# Patient Record
Sex: FEMALE | Race: WHITE | NOT HISPANIC OR LATINO
[De-identification: names, ages, dates, MRNs, and addresses within clinical notes are randomized per-mention and may not be internally consistent; named-entity substitution may affect disease eponyms.]

---

## 2017-10-08 ENCOUNTER — TRANSCRIPTION ENCOUNTER (OUTPATIENT)
Age: 62
End: 2017-10-08

## 2018-07-27 ENCOUNTER — TRANSCRIPTION ENCOUNTER (OUTPATIENT)
Age: 63
End: 2018-07-27

## 2019-02-06 ENCOUNTER — OUTPATIENT (OUTPATIENT)
Dept: OUTPATIENT SERVICES | Facility: HOSPITAL | Age: 64
LOS: 1 days | End: 2019-02-06
Payer: COMMERCIAL

## 2019-02-06 PROCEDURE — 71046 X-RAY EXAM CHEST 2 VIEWS: CPT | Mod: 26

## 2019-03-28 ENCOUNTER — TRANSCRIPTION ENCOUNTER (OUTPATIENT)
Age: 64
End: 2019-03-28

## 2020-01-06 ENCOUNTER — TRANSCRIPTION ENCOUNTER (OUTPATIENT)
Age: 65
End: 2020-01-06

## 2020-01-10 ENCOUNTER — TRANSCRIPTION ENCOUNTER (OUTPATIENT)
Age: 65
End: 2020-01-10

## 2020-05-06 ENCOUNTER — TRANSCRIPTION ENCOUNTER (OUTPATIENT)
Age: 65
End: 2020-05-06

## 2020-07-01 ENCOUNTER — TRANSCRIPTION ENCOUNTER (OUTPATIENT)
Age: 65
End: 2020-07-01

## 2020-11-10 ENCOUNTER — APPOINTMENT (OUTPATIENT)
Dept: RADIOLOGY | Facility: CLINIC | Age: 65
End: 2020-11-10
Payer: MEDICAID

## 2020-11-10 PROCEDURE — 71046 X-RAY EXAM CHEST 2 VIEWS: CPT

## 2021-03-31 PROBLEM — Z00.00 ENCOUNTER FOR PREVENTIVE HEALTH EXAMINATION: Status: ACTIVE | Noted: 2021-03-31

## 2021-04-10 ENCOUNTER — NON-APPOINTMENT (OUTPATIENT)
Age: 66
End: 2021-04-10

## 2021-04-10 ENCOUNTER — APPOINTMENT (OUTPATIENT)
Dept: CARDIOLOGY | Facility: CLINIC | Age: 66
End: 2021-04-10
Payer: MEDICAID

## 2021-04-10 VITALS
HEART RATE: 94 BPM | DIASTOLIC BLOOD PRESSURE: 70 MMHG | HEIGHT: 59 IN | TEMPERATURE: 97.8 F | SYSTOLIC BLOOD PRESSURE: 130 MMHG | RESPIRATION RATE: 12 BRPM | OXYGEN SATURATION: 100 % | WEIGHT: 115 LBS | BODY MASS INDEX: 23.18 KG/M2

## 2021-04-10 VITALS
OXYGEN SATURATION: 100 % | WEIGHT: 115 LBS | HEART RATE: 94 BPM | DIASTOLIC BLOOD PRESSURE: 70 MMHG | TEMPERATURE: 97.8 F | HEIGHT: 59 IN | SYSTOLIC BLOOD PRESSURE: 130 MMHG | BODY MASS INDEX: 23.18 KG/M2

## 2021-04-10 DIAGNOSIS — Z82.49 FAMILY HISTORY OF ISCHEMIC HEART DISEASE AND OTHER DISEASES OF THE CIRCULATORY SYSTEM: ICD-10-CM

## 2021-04-10 DIAGNOSIS — Z78.9 OTHER SPECIFIED HEALTH STATUS: ICD-10-CM

## 2021-04-10 DIAGNOSIS — Z87.09 PERSONAL HISTORY OF OTHER DISEASES OF THE RESPIRATORY SYSTEM: ICD-10-CM

## 2021-04-10 PROCEDURE — 93000 ELECTROCARDIOGRAM COMPLETE: CPT

## 2021-04-10 PROCEDURE — 99204 OFFICE O/P NEW MOD 45 MIN: CPT | Mod: 25

## 2021-04-10 PROCEDURE — 99072 ADDL SUPL MATRL&STAF TM PHE: CPT

## 2021-04-10 NOTE — DISCUSSION/SUMMARY
[FreeTextEntry1] : 1. Systolic Murmur: echo to screen of aortic valve stenosis. \par \par 2. Dyspnea: chronic and patient attributes it to her asthma. Echocardiogram as above. \par \par Office will call with results.\par \par Follow up in 6 months.

## 2021-04-10 NOTE — HISTORY OF PRESENT ILLNESS
[FreeTextEntry1] : 65 year old female with history of asthma since her teenage years presents for the evaluation of a heart murmur. She sees pulmonologist, Dr. Chaves, for her asthma and she states he auscultated that her murmur got louder. Patient has no exertional chest pain or pressure. No palpitations. No lightheadedness or syncope. Patient does states she has chronic dyspnea on exertion but attributes this to her asthma.

## 2021-04-10 NOTE — PHYSICAL EXAM
[General Appearance - Well Developed] : well developed [Normal Appearance] : normal appearance [Well Groomed] : well groomed [General Appearance - Well Nourished] : well nourished [No Deformities] : no deformities [General Appearance - In No Acute Distress] : no acute distress [Normal Conjunctiva] : the conjunctiva exhibited no abnormalities [Eyelids - No Xanthelasma] : the eyelids demonstrated no xanthelasmas [Heart Rate And Rhythm] : heart rate and rhythm were normal [Heart Sounds] : normal S1 and S2 [Edema] : no peripheral edema present [FreeTextEntry1] : 2/6 systolic ejection murmur RUSB [Respiration, Rhythm And Depth] : normal respiratory rhythm and effort [Auscultation Breath Sounds / Voice Sounds] : lungs were clear to auscultation bilaterally [Exaggerated Use Of Accessory Muscles For Inspiration] : no accessory muscle use [Abnormal Walk] : normal gait [Gait - Sufficient For Exercise Testing] : the gait was sufficient for exercise testing [Nail Clubbing] : no clubbing of the fingernails [Cyanosis, Localized] : no localized cyanosis [] : no ischemic changes

## 2021-04-10 NOTE — REASON FOR VISIT
[Initial Evaluation] : an initial evaluation of [Dyspnea] : dyspnea [FreeTextEntry1] : systolic murmur

## 2021-04-28 ENCOUNTER — NON-APPOINTMENT (OUTPATIENT)
Age: 66
End: 2021-04-28

## 2021-04-29 ENCOUNTER — APPOINTMENT (OUTPATIENT)
Dept: CARDIOLOGY | Facility: CLINIC | Age: 66
End: 2021-04-29
Payer: MEDICAID

## 2021-04-29 PROCEDURE — 93306 TTE W/DOPPLER COMPLETE: CPT

## 2021-04-29 PROCEDURE — 99072 ADDL SUPL MATRL&STAF TM PHE: CPT

## 2021-05-06 ENCOUNTER — APPOINTMENT (OUTPATIENT)
Dept: CARDIOLOGY | Facility: CLINIC | Age: 66
End: 2021-05-06
Payer: MEDICAID

## 2021-05-06 VITALS
TEMPERATURE: 98 F | HEART RATE: 89 BPM | OXYGEN SATURATION: 95 % | DIASTOLIC BLOOD PRESSURE: 60 MMHG | BODY MASS INDEX: 23.18 KG/M2 | HEIGHT: 59 IN | SYSTOLIC BLOOD PRESSURE: 110 MMHG | WEIGHT: 115 LBS

## 2021-05-06 PROCEDURE — 99215 OFFICE O/P EST HI 40 MIN: CPT

## 2021-05-06 PROCEDURE — 99072 ADDL SUPL MATRL&STAF TM PHE: CPT

## 2021-05-06 NOTE — DISCUSSION/SUMMARY
[FreeTextEntry1] : 1. Dyspnea/HOCM: echocardiogram demonstrates what is likely HOCM. Patient with no HTN history. Will obtain cardiac MRI to confirm diagnosis, accurately measure LV wall thickness, and detect for LGE.  LVOT peak gradient of 77mmHg, so will start Cardizem CD 180mg daily (no beta blocker therapy due to active wheezing). Patient with no family history of sudden explained death, no known family history of HCM. Patient has no personal history of lightheadedness or syncope. Once cardiac MRI performed and reviewed will bring back patient to discuss family follow up regarding screening for HCM, as well as refer to HF specialist Dr. Khan. At that time will also order event monitor to detect any episodes of NSVT or paroxysmal atrial fibrillation. \par \par 2. Pericardial Effusion: periodic echo surveillance.

## 2021-05-06 NOTE — CARDIOLOGY SUMMARY
[de-identified] :  4/10/2021, NSR with possible combined atrial enlargement [de-identified] : 4/29/2021, LV EF >65%, VALERYI with mild MR, moderate LVH, septum 1.4cm, PWT 1.2cm. LVOT obstruction with peak gradient of 77mmHg, pericardial effusion inferiorly, mild TR with estimated PASP of 44mHg.

## 2021-05-06 NOTE — PHYSICAL EXAM
[Normal] : moves all extremities, no focal deficits, normal speech [de-identified] : No JVD, no carotid artery bruits auscultated bilaterally [de-identified] : heart rate and rhythm were normal, normal S1 and S2 and no peripheral edema present. \par 2/6 systolic ejection murmur RUSB.  [de-identified] : expiratory wheezing bilateral lung fields.

## 2021-05-06 NOTE — REVIEW OF SYSTEMS
[Joint Stiffness] : joint stiffness [Negative] : Neurological [FreeTextEntry5] : see HPI [FreeTextEntry6] : see HPI

## 2021-06-29 ENCOUNTER — OUTPATIENT (OUTPATIENT)
Dept: OUTPATIENT SERVICES | Facility: HOSPITAL | Age: 66
LOS: 1 days | End: 2021-06-29
Payer: MEDICAID

## 2021-06-29 ENCOUNTER — RESULT REVIEW (OUTPATIENT)
Age: 66
End: 2021-06-29

## 2021-06-29 ENCOUNTER — APPOINTMENT (OUTPATIENT)
Dept: MRI IMAGING | Facility: CLINIC | Age: 66
End: 2021-06-29
Payer: MEDICAID

## 2021-06-29 DIAGNOSIS — I42.1 OBSTRUCTIVE HYPERTROPHIC CARDIOMYOPATHY: ICD-10-CM

## 2021-06-29 PROCEDURE — 75561 CARDIAC MRI FOR MORPH W/DYE: CPT

## 2021-06-29 PROCEDURE — 75565 CARD MRI VELOC FLOW MAPPING: CPT

## 2021-06-29 PROCEDURE — A9585: CPT

## 2021-06-29 PROCEDURE — 75561 CARDIAC MRI FOR MORPH W/DYE: CPT | Mod: 26

## 2021-06-29 PROCEDURE — 75565 CARD MRI VELOC FLOW MAPPING: CPT | Mod: 26

## 2021-07-13 ENCOUNTER — NON-APPOINTMENT (OUTPATIENT)
Age: 66
End: 2021-07-13

## 2021-07-20 ENCOUNTER — APPOINTMENT (OUTPATIENT)
Dept: CARDIOLOGY | Facility: CLINIC | Age: 66
End: 2021-07-20
Payer: MEDICAID

## 2021-07-20 VITALS
WEIGHT: 115 LBS | OXYGEN SATURATION: 96 % | TEMPERATURE: 97.8 F | BODY MASS INDEX: 23.18 KG/M2 | HEIGHT: 59 IN | DIASTOLIC BLOOD PRESSURE: 60 MMHG | SYSTOLIC BLOOD PRESSURE: 122 MMHG | HEART RATE: 90 BPM

## 2021-07-20 PROCEDURE — 99214 OFFICE O/P EST MOD 30 MIN: CPT

## 2021-07-20 PROCEDURE — 99072 ADDL SUPL MATRL&STAF TM PHE: CPT

## 2021-07-20 RX ORDER — BUDESONIDE AND FORMOTEROL FUMARATE DIHYDRATE 160; 4.5 UG/1; UG/1
160-4.5 AEROSOL RESPIRATORY (INHALATION)
Refills: 0 | Status: DISCONTINUED | COMMUNITY
End: 2021-07-20

## 2021-07-20 RX ORDER — ALBUTEROL SULFATE 90 UG/1
108 (90 BASE) AEROSOL, METERED RESPIRATORY (INHALATION)
Refills: 0 | Status: DISCONTINUED | COMMUNITY
End: 2021-07-20

## 2021-07-20 RX ORDER — ALBUTEROL 90 MCG
90 AEROSOL (GRAM) INHALATION
Refills: 0 | Status: DISCONTINUED | COMMUNITY
End: 2021-07-20

## 2021-07-20 RX ORDER — BUDESONIDE, GLYCOPYRROLATE, AND FORMOTEROL FUMARATE 160; 9; 4.8 UG/1; UG/1; UG/1
160-9-4.8 AEROSOL, METERED RESPIRATORY (INHALATION)
Refills: 0 | Status: ACTIVE | COMMUNITY

## 2021-07-20 RX ORDER — FLUTICASONE PROPIONATE AND SALMETEROL 500; 50 UG/1; UG/1
500-50 POWDER RESPIRATORY (INHALATION)
Refills: 0 | Status: DISCONTINUED | COMMUNITY
End: 2021-07-20

## 2021-07-20 NOTE — HISTORY OF PRESENT ILLNESS
[FreeTextEntry1] : Historical Perspective:\par 65 year old female with history of asthma since her teenage years presents for the evaluation of a heart murmur. She sees pulmonologist, Dr. Chaves, for her asthma and she states he auscultated that her murmur got louder. Patient has no exertional chest pain or pressure. No palpitations. No lightheadedness or syncope. Patient does states she has chronic dyspnea on exertion but attributes this to her asthma. \par \par Current Health Status:\par Since seeing me last patient saw pulmonologist, Dr. Chaves, and had her medications adjusted. She states her SOB has improved. There is no chest pain or pressure. No palpitations. No lightheadedness.

## 2021-07-20 NOTE — DISCUSSION/SUMMARY
[FreeTextEntry1] : 1. Dyspnea/HOCM: echocardiogram demonstrates what is likely HOCM.  LVOT peak gradient of 77mmHg on echocardiogram from April 202, so started on Cardizem CD 180mg daily (no beta blocker therapy due to active wheezing). Patient had cardiac MRI done 6/29/2021, which demonstrated hyperdynamic LV. Basal anteroseptal, anterior, and anterolateral wall measured 1.5mm. No late gadolinium enhancement. Increased velocities out LVOT but no obstruction. Patient with no family history of sudden explained death, no known family history of HCM. Patient has no personal history of lightheadedness or syncope. Recommend increasing Cardizem CD to 240mg daily today and titrate up to resting HRs in the 60s as BP tolerates. \par \par 2. Pericardial Effusion: periodic echo surveillance. \par \par Follow up in 3 months.

## 2021-07-20 NOTE — PHYSICAL EXAM
[Normal] : moves all extremities, no focal deficits, normal speech [de-identified] : No JVD, no carotid artery bruits auscultated bilaterally Allergy; [de-identified] : heart rate and rhythm were normal, normal S1 and S2 and no peripheral edema present. \par 2/6 systolic ejection murmur RUSB.  [de-identified] : expiratory wheezing bilateral lung fields.

## 2021-07-20 NOTE — CARDIOLOGY SUMMARY
[de-identified] :  4/10/2021, NSR with possible combined atrial enlargement [de-identified] : 4/29/2021, LV EF >65%, VALERIY with mild MR, moderate LVH, septum 1.4cm, PWT 1.2cm. LVOT obstruction with peak gradient of 77mmHg, pericardial effusion inferiorly, mild TR with estimated PASP of 44mHg. [de-identified] : 6/29/2021, Cardiac MRI, Hyperdynamic LV. Basal anteroseptal, anterior, and anterolateral wall measured 1.5mm. No late gadolinium enhancement. Increased velocities out LVOT but no obstruction.

## 2021-10-26 ENCOUNTER — APPOINTMENT (OUTPATIENT)
Dept: CARDIOLOGY | Facility: CLINIC | Age: 66
End: 2021-10-26
Payer: MEDICARE

## 2021-10-26 ENCOUNTER — NON-APPOINTMENT (OUTPATIENT)
Age: 66
End: 2021-10-26

## 2021-10-26 VITALS
DIASTOLIC BLOOD PRESSURE: 62 MMHG | OXYGEN SATURATION: 95 % | WEIGHT: 113 LBS | BODY MASS INDEX: 22.78 KG/M2 | HEART RATE: 83 BPM | HEIGHT: 59 IN | SYSTOLIC BLOOD PRESSURE: 110 MMHG

## 2021-10-26 PROCEDURE — 99214 OFFICE O/P EST MOD 30 MIN: CPT | Mod: 25

## 2021-10-26 PROCEDURE — 93000 ELECTROCARDIOGRAM COMPLETE: CPT

## 2021-10-26 NOTE — DISCUSSION/SUMMARY
[FreeTextEntry1] : 1. Dyspnea/HOCM: echocardiogram demonstrates what is likely HOCM.  LVOT peak gradient of 77mmHg on echocardiogram from April 202, so started on Cardizem CD (no beta blocker therapy due to active wheezing/asthma in the past). Patient had cardiac MRI done 6/29/2021, which demonstrated hyperdynamic LV. Basal anteroseptal, anterior, and anterolateral wall measured 1.5mm. No late gadolinium enhancement. Increased velocities out LVOT but no obstruction. Patient with no family history of sudden explained death, no known family history of HCM. Patient has no personal history of lightheadedness or syncope. Recommend continuing Cardizem CD to 240mg daily.\par \par 2. Pericardial Effusion: periodic echo surveillance. \par \par Follow up in 6 months.

## 2021-10-26 NOTE — CARDIOLOGY SUMMARY
[de-identified] : 10/26/2021, NSR [de-identified] : 4/29/2021, LV EF >65%, VALERIY with mild MR, moderate LVH, septum 1.4cm, PWT 1.2cm. LVOT obstruction with peak gradient of 77mmHg, pericardial effusion inferiorly, mild TR with estimated PASP of 44mHg. [de-identified] : 6/29/2021, Cardiac MRI, Hyperdynamic LV. Basal anteroseptal, anterior, and anterolateral wall measured 1.5mm. No late gadolinium enhancement. Increased velocities out LVOT but no obstruction.

## 2021-10-26 NOTE — PHYSICAL EXAM
[Normal S1, S2] : normal S1, S2 [Normal] : moves all extremities, no focal deficits, normal speech [de-identified] : No carotid artery bruits auscultated bilaterally [de-identified] : 3/6 systolic ejection murmur base of heart [de-identified] : \par 2/6 systolic ejection murmur RUSB.  30-May-2018

## 2022-04-22 ENCOUNTER — APPOINTMENT (OUTPATIENT)
Dept: CARDIOLOGY | Facility: CLINIC | Age: 67
End: 2022-04-22
Payer: MEDICARE

## 2022-04-22 PROCEDURE — 93306 TTE W/DOPPLER COMPLETE: CPT

## 2022-05-13 ENCOUNTER — NON-APPOINTMENT (OUTPATIENT)
Age: 67
End: 2022-05-13

## 2022-05-13 ENCOUNTER — APPOINTMENT (OUTPATIENT)
Dept: CARDIOLOGY | Facility: CLINIC | Age: 67
End: 2022-05-13
Payer: MEDICARE

## 2022-05-13 VITALS
SYSTOLIC BLOOD PRESSURE: 114 MMHG | HEART RATE: 80 BPM | OXYGEN SATURATION: 96 % | DIASTOLIC BLOOD PRESSURE: 62 MMHG | BODY MASS INDEX: 23.18 KG/M2 | WEIGHT: 115 LBS | HEIGHT: 59 IN

## 2022-05-13 PROCEDURE — 99214 OFFICE O/P EST MOD 30 MIN: CPT | Mod: 25

## 2022-05-13 PROCEDURE — 93000 ELECTROCARDIOGRAM COMPLETE: CPT

## 2022-05-13 RX ORDER — MONTELUKAST 10 MG/1
10 TABLET, FILM COATED ORAL DAILY
Refills: 0 | Status: DISCONTINUED | COMMUNITY
End: 2022-05-13

## 2022-05-13 RX ORDER — FLUTICASONE FUROATE, UMECLIDINIUM BROMIDE AND VILANTEROL TRIFENATATE 100; 62.5; 25 UG/1; UG/1; UG/1
100-62.5-25 POWDER RESPIRATORY (INHALATION)
Refills: 0 | Status: DISCONTINUED | COMMUNITY
End: 2022-05-13

## 2022-05-13 NOTE — DISCUSSION/SUMMARY
[FreeTextEntry1] : 1. Dyspnea/HOCM: echocardiogram demonstrates what is likely HOCM.  LVOT peak gradient of 77mmHg on echocardiogram from April 2021q, so started on Cardizem CD (no beta blocker therapy due to active wheezing/asthma in the past). Patient had cardiac MRI done 6/29/2021, which demonstrated hyperdynamic LV. Basal anteroseptal, anterior, and anterolateral wall measured 1.5mm. No late gadolinium enhancement. Increased velocities out LVOT but no obstruction. Patient with no family history of sudden explained death, no known family history of HCM. Patient has no personal history of lightheadedness or syncope. Recommend continuing Cardizem CD to 240mg daily. Repeat echocardiogram showed improvement.\par \par 2. Pericardial Effusion: Stable. Periodic echo surveillance. \par \par Follow up in 6 months. \par \par

## 2022-05-13 NOTE — CARDIOLOGY SUMMARY
[de-identified] : 10/26/2021, NSR [de-identified] : 4/22/2022, LV EF >65%, VALERIY with mild MR, mild LVH, LVOT obstruction with peak gradient 18mmHg, pericardial effusion inferiorly measuring 1.4cm, mild TR with estimated PASP of 32mmHg.\par 4/29/2021, LV EF >65%, VALERIY with mild MR, moderate LVH, septum 1.4cm, PWT 1.2cm. LVOT obstruction with peak gradient of 77mmHg, pericardial effusion inferiorly, mild TR with estimated PASP of 44mHg. [de-identified] : 6/29/2021, Cardiac MRI, Hyperdynamic LV. Basal anteroseptal, anterior, and anterolateral wall measured 1.5mm. No late gadolinium enhancement. Increased velocities out LVOT but no obstruction.

## 2022-05-13 NOTE — PHYSICAL EXAM
[Normal S1, S2] : normal S1, S2 [Normal] : moves all extremities, no focal deficits, normal speech [de-identified] : No carotid artery bruits auscultated bilaterally [de-identified] : 3/6 systolic ejection murmur base of heart [de-identified] : \par 2/6 systolic ejection murmur RUSB.

## 2022-05-13 NOTE — HISTORY OF PRESENT ILLNESS
[FreeTextEntry1] : Historical Perspective:\par 65 year old female with history of asthma since her teenage years presents for the evaluation of a heart murmur. She sees pulmonologist, Dr. Chaves, for her asthma and she states he auscultated that her murmur got louder. Patient has no exertional chest pain or pressure. No palpitations. No lightheadedness or syncope. Patient does states she has chronic dyspnea on exertion but attributes this to her asthma. \par \par Current Health Status:\par Patient with no chest pain or palpitations. Dyspnea is stable. No hospitalizations since seeing me last. Remains compliant with his medications and reports no adverse effects.\par

## 2022-10-26 ENCOUNTER — NON-APPOINTMENT (OUTPATIENT)
Age: 67
End: 2022-10-26

## 2022-11-17 ENCOUNTER — NON-APPOINTMENT (OUTPATIENT)
Age: 67
End: 2022-11-17

## 2022-11-17 ENCOUNTER — APPOINTMENT (OUTPATIENT)
Dept: CARDIOLOGY | Facility: CLINIC | Age: 67
End: 2022-11-17

## 2022-11-17 VITALS
OXYGEN SATURATION: 96 % | HEIGHT: 59 IN | BODY MASS INDEX: 23.18 KG/M2 | DIASTOLIC BLOOD PRESSURE: 60 MMHG | WEIGHT: 115 LBS | SYSTOLIC BLOOD PRESSURE: 112 MMHG | HEART RATE: 93 BPM

## 2022-11-17 PROCEDURE — 93000 ELECTROCARDIOGRAM COMPLETE: CPT

## 2022-11-17 PROCEDURE — 99214 OFFICE O/P EST MOD 30 MIN: CPT | Mod: 25

## 2022-11-17 NOTE — PHYSICAL EXAM
[Normal S1, S2] : normal S1, S2 [Normal] : moves all extremities, no focal deficits, normal speech [de-identified] : No carotid artery bruits auscultated bilaterally [de-identified] : 2/6 systolic ejection murmur base of heart [de-identified] : \par 2/6 systolic ejection murmur RUSB.

## 2022-11-17 NOTE — CARDIOLOGY SUMMARY
[de-identified] : 11/17/2022, NSR, normal ECG [de-identified] : 4/22/2022, LV EF >65%, VALERIY with mild MR, mild LVH, LVOT obstruction with peak gradient 18mmHg, pericardial effusion inferiorly measuring 1.4cm, mild TR with estimated PASP of 32mmHg.\par 4/29/2021, LV EF >65%, VALERIY with mild MR, moderate LVH, septum 1.4cm, PWT 1.2cm. LVOT obstruction with peak gradient of 77mmHg, pericardial effusion inferiorly, mild TR with estimated PASP of 44mHg. [de-identified] : 6/29/2021, Cardiac MRI, Hyperdynamic LV. Basal anteroseptal, anterior, and anterolateral wall measured 1.5mm. No late gadolinium enhancement. Increased velocities out LVOT but no obstruction.

## 2022-11-17 NOTE — DISCUSSION/SUMMARY
[EKG obtained to assist in diagnosis and management of assessed problem(s)] : EKG obtained to assist in diagnosis and management of assessed problem(s) [FreeTextEntry1] : 1. Dyspnea/HOCM: echocardiogram demonstrates what is likely HOCM.  LVOT peak gradient of 77mmHg on echocardiogram from April 2021q, so started on Cardizem CD (no beta blocker therapy due to active wheezing/asthma in the past). Patient had cardiac MRI done 6/29/2021, which demonstrated hyperdynamic LV. Basal anteroseptal, anterior, and anterolateral wall measured 1.5mm. No late gadolinium enhancement. Increased velocities out LVOT but no obstruction. Patient with no family history of sudden explained death, no known family history of HCM. Patient has no personal history of lightheadedness or syncope. Recommend continuing Cardizem CD to 240mg daily. Repeat echocardiogram showed improvement. Recommend periodic echo surveillance. \par \par 2. Pericardial Effusion: Stable. Periodic echo surveillance. \par \par Follow up in 6 months. \par \par

## 2022-11-17 NOTE — HISTORY OF PRESENT ILLNESS
[FreeTextEntry1] : Historical Perspective:\par 67 year old female with history of asthma since her teenage years presents for the evaluation of a heart murmur. She sees pulmonologist, Dr. Chaves, for her asthma and she states he auscultated that her murmur got louder. Patient has no exertional chest pain or pressure. No palpitations. No lightheadedness or syncope. Patient does states she has chronic dyspnea on exertion but attributes this to her asthma. \par \par Current Health Status:\par Patient with no chest pain or palpitations. Dyspnea is stable. No hospitalizations since seeing me last. Remains compliant with his medications and reports no adverse effects.\par

## 2023-03-16 RX ORDER — ATORVASTATIN CALCIUM 20 MG/1
20 TABLET, FILM COATED ORAL
Qty: 90 | Refills: 3 | Status: ACTIVE | COMMUNITY
Start: 1900-01-01 | End: 1900-01-01

## 2023-04-11 ENCOUNTER — NON-APPOINTMENT (OUTPATIENT)
Age: 68
End: 2023-04-11

## 2023-05-18 ENCOUNTER — NON-APPOINTMENT (OUTPATIENT)
Age: 68
End: 2023-05-18

## 2023-05-18 ENCOUNTER — APPOINTMENT (OUTPATIENT)
Dept: CARDIOLOGY | Facility: CLINIC | Age: 68
End: 2023-05-18
Payer: MEDICARE

## 2023-05-18 VITALS
WEIGHT: 119 LBS | OXYGEN SATURATION: 95 % | SYSTOLIC BLOOD PRESSURE: 130 MMHG | DIASTOLIC BLOOD PRESSURE: 76 MMHG | BODY MASS INDEX: 23.99 KG/M2 | HEART RATE: 90 BPM | HEIGHT: 59 IN

## 2023-05-18 PROCEDURE — 93306 TTE W/DOPPLER COMPLETE: CPT

## 2023-05-18 PROCEDURE — 93000 ELECTROCARDIOGRAM COMPLETE: CPT

## 2023-05-18 PROCEDURE — 99214 OFFICE O/P EST MOD 30 MIN: CPT | Mod: 25

## 2023-05-18 NOTE — DISCUSSION/SUMMARY
[FreeTextEntry1] : 1. Dyspnea/HOCM: echocardiogram demonstrates what is likely HOCM.  LVOT peak gradient of 77mmHg on echocardiogram from April 2021q, so started on Cardizem CD (no beta blocker therapy due to active wheezing/asthma in the past). Patient had cardiac MRI done 6/29/2021, which demonstrated hyperdynamic LV. Basal anteroseptal, anterior, and anterolateral wall measured 1.5mm. No late gadolinium enhancement. Increased velocities out LVOT but no obstruction. Patient with no family history of sudden explained death, no known family history of HCM. Patient has no personal history of lightheadedness or syncope. Recommend continuing Cardizem CD to 240mg daily. Repeat echocardiogram showed improvement. Recommend periodic echo surveillance. \par \par 2. Pericardial Effusion: Stable. Periodic echo surveillance. \par \par Follow up in 6 months. \par \par  [EKG obtained to assist in diagnosis and management of assessed problem(s)] : EKG obtained to assist in diagnosis and management of assessed problem(s)

## 2023-05-18 NOTE — PHYSICAL EXAM
[Normal S1, S2] : normal S1, S2 [Normal] : moves all extremities, no focal deficits, normal speech [de-identified] : No carotid artery bruits auscultated bilaterally [de-identified] : 2/6 systolic ejection murmur base of heart [de-identified] : \par 2/6 systolic ejection murmur RUSB.

## 2023-05-18 NOTE — CARDIOLOGY SUMMARY
[de-identified] : 5/18/2023, NSR, normal ECG\par 11/17/2022, NSR, normal ECG [de-identified] : 4/22/2022, LV EF >65%, VALERIY with mild MR, mild LVH, LVOT obstruction with peak gradient 18mmHg, pericardial effusion inferiorly measuring 1.4cm, mild TR with estimated PASP of 32mmHg.\par 4/29/2021, LV EF >65%, VALERIY with mild MR, moderate LVH, septum 1.4cm, PWT 1.2cm. LVOT obstruction with peak gradient of 77mmHg, pericardial effusion inferiorly, mild TR with estimated PASP of 44mHg. [de-identified] : 6/29/2021, Cardiac MRI, Hyperdynamic LV. Basal anteroseptal, anterior, and anterolateral wall measured 1.5mm. No late gadolinium enhancement. Increased velocities out LVOT but no obstruction.

## 2023-07-02 ENCOUNTER — NON-APPOINTMENT (OUTPATIENT)
Age: 68
End: 2023-07-02

## 2023-09-27 ENCOUNTER — APPOINTMENT (OUTPATIENT)
Dept: RADIOLOGY | Facility: CLINIC | Age: 68
End: 2023-09-27

## 2023-11-03 ENCOUNTER — APPOINTMENT (OUTPATIENT)
Dept: NEUROLOGY | Facility: CLINIC | Age: 68
End: 2023-11-03

## 2023-11-16 ENCOUNTER — APPOINTMENT (OUTPATIENT)
Dept: CARDIOLOGY | Facility: CLINIC | Age: 68
End: 2023-11-16
Payer: MEDICARE

## 2023-11-16 ENCOUNTER — NON-APPOINTMENT (OUTPATIENT)
Age: 68
End: 2023-11-16

## 2023-11-16 VITALS
HEIGHT: 59 IN | BODY MASS INDEX: 23.18 KG/M2 | DIASTOLIC BLOOD PRESSURE: 66 MMHG | WEIGHT: 115 LBS | HEART RATE: 92 BPM | SYSTOLIC BLOOD PRESSURE: 114 MMHG | OXYGEN SATURATION: 94 %

## 2023-11-16 DIAGNOSIS — R06.09 OTHER FORMS OF DYSPNEA: ICD-10-CM

## 2023-11-16 PROCEDURE — 93000 ELECTROCARDIOGRAM COMPLETE: CPT

## 2023-11-16 PROCEDURE — 99214 OFFICE O/P EST MOD 30 MIN: CPT | Mod: 25

## 2023-11-16 RX ORDER — FLUTICASONE PROPIONATE 50 UG/1
50 SPRAY, METERED NASAL
Qty: 16 | Refills: 0 | Status: DISCONTINUED | COMMUNITY
Start: 2022-10-27 | End: 2023-11-16

## 2023-12-26 ENCOUNTER — APPOINTMENT (OUTPATIENT)
Dept: RADIOLOGY | Facility: CLINIC | Age: 68
End: 2023-12-26
Payer: MEDICARE

## 2023-12-26 PROCEDURE — 71046 X-RAY EXAM CHEST 2 VIEWS: CPT

## 2024-01-03 ENCOUNTER — APPOINTMENT (OUTPATIENT)
Dept: CT IMAGING | Facility: CLINIC | Age: 69
End: 2024-01-03
Payer: MEDICARE

## 2024-01-03 PROCEDURE — 71250 CT THORAX DX C-: CPT

## 2024-02-02 ENCOUNTER — APPOINTMENT (OUTPATIENT)
Dept: RADIOLOGY | Facility: CLINIC | Age: 69
End: 2024-02-02
Payer: MEDICARE

## 2024-02-02 PROCEDURE — 71046 X-RAY EXAM CHEST 2 VIEWS: CPT

## 2024-05-16 ENCOUNTER — APPOINTMENT (OUTPATIENT)
Dept: CARDIOLOGY | Facility: CLINIC | Age: 69
End: 2024-05-16
Payer: MEDICARE

## 2024-05-16 ENCOUNTER — NON-APPOINTMENT (OUTPATIENT)
Age: 69
End: 2024-05-16

## 2024-05-16 VITALS
OXYGEN SATURATION: 95 % | HEART RATE: 93 BPM | SYSTOLIC BLOOD PRESSURE: 132 MMHG | DIASTOLIC BLOOD PRESSURE: 72 MMHG | HEIGHT: 59 IN | BODY MASS INDEX: 23.18 KG/M2 | WEIGHT: 115 LBS

## 2024-05-16 DIAGNOSIS — I31.39 OTHER PERICARDIAL EFFUSION (NONINFLAMMATORY): ICD-10-CM

## 2024-05-16 DIAGNOSIS — E78.00 PURE HYPERCHOLESTEROLEMIA, UNSPECIFIED: ICD-10-CM

## 2024-05-16 DIAGNOSIS — I42.1 OBSTRUCTIVE HYPERTROPHIC CARDIOMYOPATHY: ICD-10-CM

## 2024-05-16 PROCEDURE — G2211 COMPLEX E/M VISIT ADD ON: CPT

## 2024-05-16 PROCEDURE — 99214 OFFICE O/P EST MOD 30 MIN: CPT

## 2024-05-16 PROCEDURE — 93306 TTE W/DOPPLER COMPLETE: CPT

## 2024-05-16 PROCEDURE — 93000 ELECTROCARDIOGRAM COMPLETE: CPT

## 2024-05-16 NOTE — PHYSICAL EXAM
[Normal S1, S2] : normal S1, S2 [Normal] : moves all extremities, no focal deficits, normal speech [de-identified] : No carotid artery bruits auscultated bilaterally [de-identified] : 2/6 systolic ejection murmur base of heart [de-identified] : \par  2/6 systolic ejection murmur RUSB.

## 2024-05-16 NOTE — CARDIOLOGY SUMMARY
[de-identified] : 5/16/2024, NSR, left axis 11/16/2023, NSR, normal ECG 5/18/2023, NSR, normal ECG 11/17/2022, NSR, normal ECG [de-identified] : 5/16/2024, LV EF >65% increased LVOT gradients compared to previous echocardiogram, 4/2022 4/22/2022, LV EF >65%, VALERIY with mild MR, mild LVH, LVOT obstruction with peak gradient 18mmHg, pericardial effusion inferiorly measuring 1.4cm, mild TR with estimated PASP of 32mmHg. 4/29/2021, LV EF >65%, VALERIY with mild MR, moderate LVH, septum 1.4cm, PWT 1.2cm. LVOT obstruction with peak gradient of 77mmHg, pericardial effusion inferiorly, mild TR with estimated PASP of 44mHg. [de-identified] : 6/29/2021, Cardiac MRI, Hyperdynamic LV. Basal anteroseptal, anterior, and anterolateral wall measured 1.5mm. No late gadolinium enhancement. Increased velocities out LVOT but no obstruction.

## 2024-05-16 NOTE — HISTORY OF PRESENT ILLNESS
[FreeTextEntry1] : Historical Perspective: 68 year old female with history of asthma since her teenage years presents for the evaluation of a heart murmur. She sees pulmonologist, Dr. Chaves, for her asthma and she states he auscultated that her murmur got louder. Patient has no exertional chest pain or pressure. No palpitations. No lightheadedness or syncope. Patient does states she has chronic dyspnea on exertion but attributes this to her asthma.   Current Health Status: Patient with no chest pain or palpitations. Dyspnea is stable. No hospitalizations since seeing me last. Remains compliant with his medications and reports no adverse effects.

## 2024-05-16 NOTE — DISCUSSION/SUMMARY
[FreeTextEntry1] : 1. Dyspnea/HOCM: echocardiogram demonstrates what is likely HOCM.  LVOT peak gradient of 77mmHg on echocardiogram from April 2021q, so started on Cardizem CD (no beta blocker therapy due to active wheezing/asthma in the past). Patient had cardiac MRI done 6/29/2021, which demonstrated hyperdynamic LV. Basal anteroseptal, anterior, and anterolateral wall measured 1.5mm. No late gadolinium enhancement. Increased velocities out LVOT but no obstruction. Patient with no family history of sudden explained death, no known family history of HCM. Patient has no personal history of lightheadedness or syncope. Recommend increasing Cardizem CD to 360mg daily, as repeat echocardiogram today showed increased gradients out LVOT. Asked patient to maintain adequate hydration. Recommend periodic echo surveillance.   2. Pericardial Effusion: Stable. Periodic echo surveillance.   Follow up in 6 months.    [EKG obtained to assist in diagnosis and management of assessed problem(s)] : EKG obtained to assist in diagnosis and management of assessed problem(s)

## 2024-05-21 RX ORDER — DILTIAZEM HYDROCHLORIDE 360 MG/1
360 CAPSULE, COATED, EXTENDED RELEASE ORAL
Qty: 90 | Refills: 3 | Status: ACTIVE | COMMUNITY
Start: 2021-05-06 | End: 1900-01-01

## 2024-06-06 ENCOUNTER — NON-APPOINTMENT (OUTPATIENT)
Age: 69
End: 2024-06-06

## 2024-08-29 ENCOUNTER — NON-APPOINTMENT (OUTPATIENT)
Age: 69
End: 2024-08-29

## 2024-11-16 ENCOUNTER — NON-APPOINTMENT (OUTPATIENT)
Age: 69
End: 2024-11-16

## 2024-11-21 ENCOUNTER — APPOINTMENT (OUTPATIENT)
Dept: CARDIOLOGY | Facility: CLINIC | Age: 69
End: 2024-11-21
Payer: MEDICARE

## 2024-11-21 ENCOUNTER — NON-APPOINTMENT (OUTPATIENT)
Age: 69
End: 2024-11-21

## 2024-11-21 VITALS
WEIGHT: 115 LBS | DIASTOLIC BLOOD PRESSURE: 60 MMHG | HEIGHT: 58 IN | SYSTOLIC BLOOD PRESSURE: 112 MMHG | BODY MASS INDEX: 24.14 KG/M2 | OXYGEN SATURATION: 96 % | HEART RATE: 110 BPM

## 2024-11-21 DIAGNOSIS — I42.1 OBSTRUCTIVE HYPERTROPHIC CARDIOMYOPATHY: ICD-10-CM

## 2024-11-21 DIAGNOSIS — I31.39 OTHER PERICARDIAL EFFUSION (NONINFLAMMATORY): ICD-10-CM

## 2024-11-21 DIAGNOSIS — E78.00 PURE HYPERCHOLESTEROLEMIA, UNSPECIFIED: ICD-10-CM

## 2024-11-21 PROCEDURE — 93000 ELECTROCARDIOGRAM COMPLETE: CPT

## 2024-11-21 PROCEDURE — G2211 COMPLEX E/M VISIT ADD ON: CPT

## 2024-11-21 PROCEDURE — 99214 OFFICE O/P EST MOD 30 MIN: CPT

## 2025-04-24 ENCOUNTER — NON-APPOINTMENT (OUTPATIENT)
Age: 70
End: 2025-04-24

## 2025-04-28 ENCOUNTER — APPOINTMENT (OUTPATIENT)
Dept: ORTHOPEDIC SURGERY | Facility: CLINIC | Age: 70
End: 2025-04-28
Payer: MEDICARE

## 2025-04-28 DIAGNOSIS — M54.12 RADICULOPATHY, CERVICAL REGION: ICD-10-CM

## 2025-04-28 DIAGNOSIS — M47.22 OTHER SPONDYLOSIS WITH RADICULOPATHY, CERVICAL REGION: ICD-10-CM

## 2025-04-28 PROCEDURE — 99204 OFFICE O/P NEW MOD 45 MIN: CPT

## 2025-04-28 PROCEDURE — 72040 X-RAY EXAM NECK SPINE 2-3 VW: CPT

## 2025-04-28 RX ORDER — METHOCARBAMOL 750 MG/1
750 TABLET, FILM COATED ORAL
Qty: 60 | Refills: 1 | Status: ACTIVE | COMMUNITY
Start: 2025-04-28 | End: 1900-01-01

## 2025-05-07 ENCOUNTER — NON-APPOINTMENT (OUTPATIENT)
Age: 70
End: 2025-05-07

## 2025-05-09 ENCOUNTER — APPOINTMENT (OUTPATIENT)
Dept: CARDIOLOGY | Facility: CLINIC | Age: 70
End: 2025-05-09
Payer: MEDICARE

## 2025-05-09 VITALS
BODY MASS INDEX: 23.18 KG/M2 | DIASTOLIC BLOOD PRESSURE: 70 MMHG | HEIGHT: 59 IN | HEART RATE: 67 BPM | OXYGEN SATURATION: 95 % | SYSTOLIC BLOOD PRESSURE: 126 MMHG | WEIGHT: 115 LBS

## 2025-05-09 DIAGNOSIS — I42.1 OBSTRUCTIVE HYPERTROPHIC CARDIOMYOPATHY: ICD-10-CM

## 2025-05-09 DIAGNOSIS — E78.00 PURE HYPERCHOLESTEROLEMIA, UNSPECIFIED: ICD-10-CM

## 2025-05-09 DIAGNOSIS — I31.39 OTHER PERICARDIAL EFFUSION (NONINFLAMMATORY): ICD-10-CM

## 2025-05-09 PROCEDURE — 93306 TTE W/DOPPLER COMPLETE: CPT

## 2025-05-09 PROCEDURE — G2211 COMPLEX E/M VISIT ADD ON: CPT

## 2025-05-09 PROCEDURE — 99214 OFFICE O/P EST MOD 30 MIN: CPT

## 2025-05-14 RX ORDER — METOPROLOL SUCCINATE 50 MG/1
50 TABLET, EXTENDED RELEASE ORAL DAILY
Qty: 90 | Refills: 3 | Status: ACTIVE | COMMUNITY
Start: 2025-05-09 | End: 1900-01-01

## 2025-05-16 RX ORDER — METHYLPREDNISOLONE 4 MG/1
4 TABLET ORAL
Qty: 1 | Refills: 0 | Status: ACTIVE | COMMUNITY
Start: 2025-05-16 | End: 1900-01-01

## 2025-06-02 ENCOUNTER — APPOINTMENT (OUTPATIENT)
Dept: ORTHOPEDIC SURGERY | Facility: CLINIC | Age: 70
End: 2025-06-02
Payer: MEDICARE

## 2025-06-02 DIAGNOSIS — M47.22 OTHER SPONDYLOSIS WITH RADICULOPATHY, CERVICAL REGION: ICD-10-CM

## 2025-06-02 DIAGNOSIS — M54.12 RADICULOPATHY, CERVICAL REGION: ICD-10-CM

## 2025-06-02 PROCEDURE — 99214 OFFICE O/P EST MOD 30 MIN: CPT

## 2025-06-02 RX ORDER — METHYLPREDNISOLONE 4 MG/1
4 TABLET ORAL
Qty: 1 | Refills: 0 | Status: ACTIVE | COMMUNITY
Start: 2025-06-02 | End: 1900-01-01

## 2025-06-19 ENCOUNTER — RESULT REVIEW (OUTPATIENT)
Age: 70
End: 2025-06-19

## 2025-06-19 ENCOUNTER — APPOINTMENT (OUTPATIENT)
Dept: MRI IMAGING | Facility: CLINIC | Age: 70
End: 2025-06-19
Payer: MEDICARE

## 2025-06-19 PROCEDURE — 72141 MRI NECK SPINE W/O DYE: CPT

## 2025-08-11 ENCOUNTER — APPOINTMENT (OUTPATIENT)
Dept: ORTHOPEDIC SURGERY | Facility: CLINIC | Age: 70
End: 2025-08-11
Payer: MEDICARE

## 2025-08-11 DIAGNOSIS — M40.202 UNSPECIFIED KYPHOSIS, CERVICAL REGION: ICD-10-CM

## 2025-08-11 DIAGNOSIS — M48.02 SPINAL STENOSIS, CERVICAL REGION: ICD-10-CM

## 2025-08-11 DIAGNOSIS — M54.12 RADICULOPATHY, CERVICAL REGION: ICD-10-CM

## 2025-08-11 DIAGNOSIS — M47.22 OTHER SPONDYLOSIS WITH RADICULOPATHY, CERVICAL REGION: ICD-10-CM

## 2025-08-11 PROCEDURE — 99214 OFFICE O/P EST MOD 30 MIN: CPT
